# Patient Record
Sex: MALE | Race: WHITE | NOT HISPANIC OR LATINO | Employment: OTHER | URBAN - METROPOLITAN AREA
[De-identification: names, ages, dates, MRNs, and addresses within clinical notes are randomized per-mention and may not be internally consistent; named-entity substitution may affect disease eponyms.]

---

## 2017-07-14 ENCOUNTER — HOSPITAL ENCOUNTER (EMERGENCY)
Facility: HOSPITAL | Age: 42
Discharge: HOME | End: 2017-07-14
Attending: EMERGENCY MEDICINE

## 2017-07-14 VITALS
RESPIRATION RATE: 18 BRPM | HEIGHT: 71 IN | WEIGHT: 185 LBS | HEART RATE: 72 BPM | OXYGEN SATURATION: 99 % | DIASTOLIC BLOOD PRESSURE: 85 MMHG | BODY MASS INDEX: 25.9 KG/M2 | SYSTOLIC BLOOD PRESSURE: 149 MMHG | TEMPERATURE: 98 F

## 2017-07-14 DIAGNOSIS — L03.011 CELLULITIS OF FINGER OF RIGHT HAND: Primary | ICD-10-CM

## 2017-07-14 PROCEDURE — 99283 EMERGENCY DEPT VISIT LOW MDM: CPT

## 2017-07-14 RX ORDER — CLINDAMYCIN HYDROCHLORIDE 150 MG/1
450 CAPSULE ORAL EVERY 8 HOURS
Qty: 45 CAPSULE | Refills: 0 | Status: SHIPPED | OUTPATIENT
Start: 2017-07-14 | End: 2017-07-19

## 2017-07-14 RX ORDER — NAPROXEN 375 MG/1
375 TABLET ORAL 2 TIMES DAILY WITH MEALS
Qty: 30 TABLET | Refills: 0 | Status: SHIPPED | OUTPATIENT
Start: 2017-07-14

## 2017-07-14 NOTE — ED NOTES
Pt examined by Dr. Mazariegos without RN, educated on prescriptions, given discharge instructions and discharged to [facility].  See provider notes for exam

## 2017-07-14 NOTE — ED PROVIDER NOTES
SCRIBE #1 NOTE: I, Jung Hugh Bee, am scribing for, and in the presence of, Ethan Mazariegos MD. I have scribed the entire note.      History      Chief Complaint   Patient presents with    Cellulitis     pt states 2 days ago his right thumb began to swell       Review of patient's allergies indicates:  No Known Allergies     HPI   HPI    7/14/2017, 1:01 PM   History obtained from the patient      History of Present Illness: Janusz Vanessa is a 42 y.o. male patient who presents to the Emergency Department for right thumb swelling and pain which onset gradually 2 days ago. Sxs are constant and moderate in severity. 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 There are no mitigating or exacerbating factors noted.  Pt denies any fever, N/V/D, numbness, trauma, wrist pain, and all other sxs at this time. No further complaints or concerns at this time.       Arrival mode: Personal vehicle  EMS  [facility]    PCP: Primary Doctor No     Past Medical History:  Past medical history reviewed not relevant      Past Surgical History:  Past surgical history reviewed not relevant      Family History:  Family history reviewed not relevant      Social History:  Social History    Social History Main Topics    Social History Main Topics    Smoking status: Unknown if ever smoked    Smokeless tobacco: Unknown if ever used    Alcohol Use: Unknown drinking history    Drug Use: Unknown if ever used    Sexual Activity: Unknown         ROS   Review of Systems   Constitutional: Negative for fever.   HENT: Negative for sore throat.    Respiratory: Negative for shortness of breath.    Cardiovascular: Negative for chest pain.   Gastrointestinal: Negative for nausea.   Genitourinary: Negative for dysuria.   Musculoskeletal: Negative for back pain.        (+) R thumb swelling and pain   Skin: Negative for rash.   Neurological: Negative for weakness.   Hematological: Does not bruise/bleed easily.  "      Physical Exam      Initial Vitals [07/14/17 1255]   BP Pulse Resp Temp SpO2   (!) 149/85 72 18 97.8 °F (36.6 °C) 99 %      MAP       106.33          Physical Exam  Nursing Notes and Vital Signs Reviewed.  Constitutional: Patient is in no acute distress. Well-developed and well-nourished.  Head: Atraumatic. Normocephalic.  Eyes: PERRL. EOM intact. Conjunctivae are not pale. No scleral icterus.  ENT: Mucous membranes are moist. Oropharynx is clear and symmetric.    Neck: Supple. Full ROM. No lymphadenopathy.  Cardiovascular: Regular rate. Regular rhythm. No murmurs, rubs, or gallops. Distal pulses are 2+ and symmetric.  Pulmonary/Chest: No respiratory distress. Clear to auscultation bilaterally. No wheezing, rales, or rhonchi.  Abdominal: Soft and non-distended.  There is no tenderness.  No rebound, guarding, or rigidity. Good bowel sounds.  Genitourinary: No CVA tenderness  Musculoskeletal: Moves all extremities. No obvious deformities. No calf tenderness.   Right Hand: No obvious deformity. There is right thumb swelling. Full flexion and extension of the wrist. Radial, median, and ulnar nerves are intact. Radial and ulnar pulses are 2+. Normal capillary refill.  Distal sensation is intact.  Skin: Warm and dry. Track marks to BUE.   Neurological:  Alert, awake, and appropriate.  Normal speech.  No acute focal neurological deficits are appreciated.  Psychiatric: Normal affect. Good eye contact. Appropriate in content.    ED Course    Procedures  ED Vital Signs:  Vitals:    07/14/17 1255   BP: (!) 149/85   Pulse: 72   Resp: 18   Temp: 97.8 °F (36.6 °C)   TempSrc: Oral   SpO2: 99%   Weight: 83.9 kg (185 lb)   Height: 5' 11" (1.803 m)           The Emergency Provider reviewed the vital signs and test results, which are outlined above.    ED Discussion     1:03 PM: Discussed plan of treatment with pt. Gave pt all f/u and return to the ED instructions. All questions and concerns were addressed at this time. Pt " understands and agrees to plan as discussed. Pt is stable for discharge.       ED Medication(s):  Medications - No data to display    Discharge Medication List as of 7/14/2017  1:03 PM      START taking these medications    Details   clindamycin (CLEOCIN) 150 MG capsule Take 3 capsules (450 mg total) by mouth every 8 (eight) hours., Starting Fri 7/14/2017, Until Wed 7/19/2017, Print      naproxen (NAPROSYN) 375 MG tablet Take 1 tablet (375 mg total) by mouth 2 (two) times daily with meals., Starting Fri 7/14/2017, Print             Follow-up Information     [facility] in 2 days.    Contact information:  [address] [facility] [address] [phone]                     Medical Decision Making              Scribe Attestation:   Scribe #1: I performed the above scribed service and the documentation accurately describes the services I performed. I attest to the accuracy of the note.    Attending:   Physician Attestation Statement for Scribe #1: I, Ethan Mazariegos MD, personally performed the services described in this documentation, as scribed by Jung Gamboa, in my presence, and it is both accurate and complete.          Clinical Impression       ICD-10-CM ICD-9-CM   1. Cellulitis of finger of right hand L03.011 681.00       Disposition:   Disposition: Discharged  Condition: Stable         Ethan Mazariegos MD  07/14/17 [id number]

## 2017-07-21 ENCOUNTER — NURSE TRIAGE (OUTPATIENT)
Dept: ADMINISTRATIVE | Facility: CLINIC | Age: 42
End: 2017-07-21

## 2017-07-21 NOTE — TELEPHONE ENCOUNTER
"  Reason for Disposition   Blood in urine, but all triage questions negative  (Exception: could be normal menstrual bleeding)    Answer Assessment - Initial Assessment Questions  1. COLOR of URINE: "Describe the color of the urine."  (e.g., tea-colored, pink, red, blood clots, bloody)      Tea-colored  2. ONSET: "When did the bleeding start?"       Today  3. EPISODES: "How many times has there been blood in the urine?" or "How many times today?"     -  4. PAIN with URINATION: "Is there any pain with passing your urine?" If so, ask: "How bad is the pain?"  (Scale 1-10; or mild, moderate, severe)     - MILD - complains slightly about urination hurting     - MODERATE - interferes with normal activities       - SEVERE - excruciating, unwilling or unable to urinate because of the pain       none  5. FEVER: "Do you have a fever?" If so, ask: "What is your temperature, how was it measured, and when did it start?"      -  6. ASSOCIATED SYMPTOMS: "Are you passing urine more frequently than usual?"      -  7. OTHER SYMPTOMS: "Do you have any other symptoms?" (e.g., back/flank pain, abdominal pain, vomiting)      -  8. PREGNANCY: "Is there any chance you are pregnant?" "When was your last menstrual period?"      -  Patient states he was prescribed clindamycin in ED. Patient began passing blood in his urine. He is requesting an alternative medication. Patient states he is uninsured has no PCP. Advised patient to go to urgent care or to return to ED.    Protocols used: ST URINE - BLOOD IN-A-    "